# Patient Record
Sex: FEMALE | Race: WHITE | URBAN - METROPOLITAN AREA
[De-identification: names, ages, dates, MRNs, and addresses within clinical notes are randomized per-mention and may not be internally consistent; named-entity substitution may affect disease eponyms.]

---

## 2023-10-04 ENCOUNTER — OFFICE VISIT (OUTPATIENT)
Dept: FAMILY MEDICINE CLINIC | Facility: CLINIC | Age: 58
End: 2023-10-04
Payer: COMMERCIAL

## 2023-10-04 VITALS
WEIGHT: 216 LBS | SYSTOLIC BLOOD PRESSURE: 160 MMHG | BODY MASS INDEX: 36.43 KG/M2 | TEMPERATURE: 98 F | DIASTOLIC BLOOD PRESSURE: 100 MMHG | HEART RATE: 77 BPM | HEIGHT: 64.57 IN | RESPIRATION RATE: 18 BRPM | OXYGEN SATURATION: 98 %

## 2023-10-04 DIAGNOSIS — Z13.1 SCREENING FOR DIABETES MELLITUS: ICD-10-CM

## 2023-10-04 DIAGNOSIS — Z12.11 SCREENING FOR COLON CANCER: ICD-10-CM

## 2023-10-04 DIAGNOSIS — I10 ESSENTIAL HYPERTENSION, BENIGN: ICD-10-CM

## 2023-10-04 DIAGNOSIS — Z78.0 POSTMENOPAUSAL: ICD-10-CM

## 2023-10-04 DIAGNOSIS — Z12.31 ENCOUNTER FOR SCREENING MAMMOGRAM FOR MALIGNANT NEOPLASM OF BREAST: ICD-10-CM

## 2023-10-04 DIAGNOSIS — Z00.00 ROUTINE GENERAL MEDICAL EXAMINATION AT A HEALTH CARE FACILITY: Primary | ICD-10-CM

## 2023-10-04 DIAGNOSIS — Z12.4 SCREENING FOR CERVICAL CANCER: ICD-10-CM

## 2023-10-04 DIAGNOSIS — E66.09 CLASS 2 OBESITY DUE TO EXCESS CALORIES WITHOUT SERIOUS COMORBIDITY WITH BODY MASS INDEX (BMI) OF 36.0 TO 36.9 IN ADULT: ICD-10-CM

## 2023-10-04 PROCEDURE — 87624 HPV HI-RISK TYP POOLED RSLT: CPT | Performed by: FAMILY MEDICINE

## 2023-10-04 PROCEDURE — 3008F BODY MASS INDEX DOCD: CPT | Performed by: FAMILY MEDICINE

## 2023-10-04 PROCEDURE — 99204 OFFICE O/P NEW MOD 45 MIN: CPT | Performed by: FAMILY MEDICINE

## 2023-10-04 PROCEDURE — 3077F SYST BP >= 140 MM HG: CPT | Performed by: FAMILY MEDICINE

## 2023-10-04 PROCEDURE — 88175 CYTOPATH C/V AUTO FLUID REDO: CPT | Performed by: FAMILY MEDICINE

## 2023-10-04 PROCEDURE — 99386 PREV VISIT NEW AGE 40-64: CPT | Performed by: FAMILY MEDICINE

## 2023-10-04 PROCEDURE — 3080F DIAST BP >= 90 MM HG: CPT | Performed by: FAMILY MEDICINE

## 2023-10-04 RX ORDER — LISINOPRIL 10 MG/1
10 TABLET ORAL DAILY
Qty: 30 TABLET | Refills: 0 | Status: SHIPPED | OUTPATIENT
Start: 2023-10-04 | End: 2023-10-04

## 2023-10-04 RX ORDER — LISINOPRIL 10 MG/1
10 TABLET ORAL DAILY
Qty: 30 TABLET | Refills: 0 | Status: SHIPPED | OUTPATIENT
Start: 2023-10-04

## 2023-10-05 LAB — HPV I/H RISK 1 DNA SPEC QL NAA+PROBE: NEGATIVE

## 2023-10-06 ENCOUNTER — LAB ENCOUNTER (OUTPATIENT)
Dept: LAB | Age: 58
End: 2023-10-06
Attending: FAMILY MEDICINE
Payer: COMMERCIAL

## 2023-10-06 DIAGNOSIS — Z13.1 SCREENING FOR DIABETES MELLITUS: ICD-10-CM

## 2023-10-06 DIAGNOSIS — Z00.00 ROUTINE GENERAL MEDICAL EXAMINATION AT A HEALTH CARE FACILITY: ICD-10-CM

## 2023-10-06 LAB
ALBUMIN SERPL-MCNC: 3.6 G/DL (ref 3.4–5)
ALBUMIN/GLOB SERPL: 0.9 {RATIO} (ref 1–2)
ALP LIVER SERPL-CCNC: 64 U/L
ALT SERPL-CCNC: 33 U/L
ANION GAP SERPL CALC-SCNC: 7 MMOL/L (ref 0–18)
AST SERPL-CCNC: 23 U/L (ref 15–37)
BASOPHILS # BLD AUTO: 0.06 X10(3) UL (ref 0–0.2)
BASOPHILS NFR BLD AUTO: 0.9 %
BILIRUB SERPL-MCNC: 0.5 MG/DL (ref 0.1–2)
BUN BLD-MCNC: 18 MG/DL (ref 7–18)
CALCIUM BLD-MCNC: 9.3 MG/DL (ref 8.5–10.1)
CHLORIDE SERPL-SCNC: 108 MMOL/L (ref 98–112)
CHOLEST SERPL-MCNC: 211 MG/DL (ref ?–200)
CO2 SERPL-SCNC: 25 MMOL/L (ref 21–32)
CREAT BLD-MCNC: 0.97 MG/DL
EGFRCR SERPLBLD CKD-EPI 2021: 67 ML/MIN/1.73M2 (ref 60–?)
EOSINOPHIL # BLD AUTO: 0.18 X10(3) UL (ref 0–0.7)
EOSINOPHIL NFR BLD AUTO: 2.6 %
ERYTHROCYTE [DISTWIDTH] IN BLOOD BY AUTOMATED COUNT: 12.3 %
FASTING PATIENT LIPID ANSWER: YES
FASTING STATUS PATIENT QL REPORTED: YES
GLOBULIN PLAS-MCNC: 4.2 G/DL (ref 2.8–4.4)
GLUCOSE BLD-MCNC: 89 MG/DL (ref 70–99)
HCT VFR BLD AUTO: 40.9 %
HDLC SERPL-MCNC: 51 MG/DL (ref 40–59)
HGB BLD-MCNC: 13.9 G/DL
IMM GRANULOCYTES # BLD AUTO: 0.01 X10(3) UL (ref 0–1)
IMM GRANULOCYTES NFR BLD: 0.1 %
LDLC SERPL CALC-MCNC: 144 MG/DL (ref ?–100)
LYMPHOCYTES # BLD AUTO: 2.49 X10(3) UL (ref 1–4)
LYMPHOCYTES NFR BLD AUTO: 35.5 %
MCH RBC QN AUTO: 30.3 PG (ref 26–34)
MCHC RBC AUTO-ENTMCNC: 34 G/DL (ref 31–37)
MCV RBC AUTO: 89.1 FL
MONOCYTES # BLD AUTO: 0.69 X10(3) UL (ref 0.1–1)
MONOCYTES NFR BLD AUTO: 9.8 %
NEUTROPHILS # BLD AUTO: 3.58 X10 (3) UL (ref 1.5–7.7)
NEUTROPHILS # BLD AUTO: 3.58 X10(3) UL (ref 1.5–7.7)
NEUTROPHILS NFR BLD AUTO: 51.1 %
NONHDLC SERPL-MCNC: 160 MG/DL (ref ?–130)
OSMOLALITY SERPL CALC.SUM OF ELEC: 291 MOSM/KG (ref 275–295)
PLATELET # BLD AUTO: 246 10(3)UL (ref 150–450)
POTASSIUM SERPL-SCNC: 4.3 MMOL/L (ref 3.5–5.1)
PROT SERPL-MCNC: 7.8 G/DL (ref 6.4–8.2)
RBC # BLD AUTO: 4.59 X10(6)UL
SODIUM SERPL-SCNC: 140 MMOL/L (ref 136–145)
TRIGL SERPL-MCNC: 88 MG/DL (ref 30–149)
TSI SER-ACNC: 1.16 MIU/ML (ref 0.36–3.74)
VLDLC SERPL CALC-MCNC: 16 MG/DL (ref 0–30)
WBC # BLD AUTO: 7 X10(3) UL (ref 4–11)

## 2023-10-06 PROCEDURE — 80053 COMPREHEN METABOLIC PANEL: CPT

## 2023-10-06 PROCEDURE — 84443 ASSAY THYROID STIM HORMONE: CPT

## 2023-10-06 PROCEDURE — 85025 COMPLETE CBC W/AUTO DIFF WBC: CPT

## 2023-10-06 PROCEDURE — 80061 LIPID PANEL: CPT

## 2023-10-06 PROCEDURE — 36415 COLL VENOUS BLD VENIPUNCTURE: CPT

## 2023-10-06 PROCEDURE — 83036 HEMOGLOBIN GLYCOSYLATED A1C: CPT

## 2023-10-07 LAB
EST. AVERAGE GLUCOSE BLD GHB EST-MCNC: 111 MG/DL (ref 68–126)
HBA1C MFR BLD: 5.5 % (ref ?–5.7)

## 2023-10-16 ENCOUNTER — HOSPITAL ENCOUNTER (OUTPATIENT)
Dept: MAMMOGRAPHY | Age: 58
Discharge: HOME | End: 2023-10-16
Attending: FAMILY MEDICINE
Payer: COMMERCIAL

## 2023-10-16 DIAGNOSIS — Z12.31 ENCOUNTER FOR SCREENING MAMMOGRAM FOR MALIGNANT NEOPLASM OF BREAST: ICD-10-CM

## 2023-10-16 LAB — AMB EXT COLOGUARD RESULT: POSITIVE

## 2023-10-16 PROCEDURE — 77067 SCR MAMMO BI INCL CAD: CPT | Performed by: FAMILY MEDICINE

## 2023-10-16 PROCEDURE — 77063 BREAST TOMOSYNTHESIS BI: CPT | Performed by: FAMILY MEDICINE

## 2023-10-24 ENCOUNTER — TELEPHONE (OUTPATIENT)
Dept: FAMILY MEDICINE CLINIC | Facility: CLINIC | Age: 58
End: 2023-10-24

## 2023-10-25 ENCOUNTER — OFFICE VISIT (OUTPATIENT)
Dept: FAMILY MEDICINE CLINIC | Facility: CLINIC | Age: 58
End: 2023-10-25

## 2023-10-25 ENCOUNTER — MED REC SCAN ONLY (OUTPATIENT)
Dept: FAMILY MEDICINE CLINIC | Facility: CLINIC | Age: 58
End: 2023-10-25

## 2023-10-25 VITALS
SYSTOLIC BLOOD PRESSURE: 150 MMHG | RESPIRATION RATE: 18 BRPM | WEIGHT: 215 LBS | TEMPERATURE: 98 F | HEIGHT: 64.57 IN | HEART RATE: 94 BPM | DIASTOLIC BLOOD PRESSURE: 90 MMHG | OXYGEN SATURATION: 98 % | BODY MASS INDEX: 36.26 KG/M2

## 2023-10-25 DIAGNOSIS — I10 ESSENTIAL HYPERTENSION, BENIGN: Primary | ICD-10-CM

## 2023-10-25 DIAGNOSIS — R19.5 POSITIVE COLORECTAL CANCER SCREENING USING COLOGUARD TEST: ICD-10-CM

## 2023-10-25 RX ORDER — LISINOPRIL 20 MG/1
20 TABLET ORAL DAILY
Qty: 45 TABLET | Refills: 0 | Status: SHIPPED | OUTPATIENT
Start: 2023-10-25

## 2023-11-06 ENCOUNTER — HOSPITAL ENCOUNTER (OUTPATIENT)
Dept: MAMMOGRAPHY | Facility: HOSPITAL | Age: 58
Discharge: HOME | End: 2023-11-06
Attending: FAMILY MEDICINE
Payer: COMMERCIAL

## 2023-11-06 DIAGNOSIS — R92.8 ABNORMAL MAMMOGRAM: ICD-10-CM

## 2023-11-06 PROCEDURE — 76642 ULTRASOUND BREAST LIMITED: CPT | Performed by: FAMILY MEDICINE

## 2023-11-06 PROCEDURE — 77061 BREAST TOMOSYNTHESIS UNI: CPT | Performed by: FAMILY MEDICINE

## 2023-11-06 PROCEDURE — 77065 DX MAMMO INCL CAD UNI: CPT | Performed by: FAMILY MEDICINE

## 2023-11-28 ENCOUNTER — OFFICE VISIT (OUTPATIENT)
Dept: FAMILY MEDICINE CLINIC | Facility: CLINIC | Age: 58
End: 2023-11-28
Payer: COMMERCIAL

## 2023-11-28 DIAGNOSIS — I10 ESSENTIAL HYPERTENSION, BENIGN: ICD-10-CM

## 2023-11-28 PROCEDURE — 3008F BODY MASS INDEX DOCD: CPT | Performed by: FAMILY MEDICINE

## 2023-11-28 PROCEDURE — 99213 OFFICE O/P EST LOW 20 MIN: CPT | Performed by: FAMILY MEDICINE

## 2023-11-28 PROCEDURE — 3075F SYST BP GE 130 - 139MM HG: CPT | Performed by: FAMILY MEDICINE

## 2023-11-28 PROCEDURE — 3079F DIAST BP 80-89 MM HG: CPT | Performed by: FAMILY MEDICINE

## 2023-11-28 RX ORDER — LISINOPRIL 20 MG/1
20 TABLET ORAL DAILY
Qty: 90 TABLET | Refills: 0 | Status: SHIPPED | OUTPATIENT
Start: 2023-11-28

## 2023-11-29 VITALS
BODY MASS INDEX: 36.26 KG/M2 | SYSTOLIC BLOOD PRESSURE: 132 MMHG | TEMPERATURE: 98 F | HEIGHT: 64.57 IN | RESPIRATION RATE: 18 BRPM | OXYGEN SATURATION: 98 % | WEIGHT: 215 LBS | HEART RATE: 82 BPM | DIASTOLIC BLOOD PRESSURE: 86 MMHG

## 2024-02-28 ENCOUNTER — OFFICE VISIT (OUTPATIENT)
Dept: FAMILY MEDICINE CLINIC | Facility: CLINIC | Age: 59
End: 2024-02-28
Payer: COMMERCIAL

## 2024-02-28 ENCOUNTER — HOSPITAL ENCOUNTER (OUTPATIENT)
Dept: GENERAL RADIOLOGY | Age: 59
Discharge: HOME | End: 2024-02-28
Attending: FAMILY MEDICINE
Payer: COMMERCIAL

## 2024-02-28 VITALS
HEART RATE: 100 BPM | BODY MASS INDEX: 36.09 KG/M2 | WEIGHT: 214 LBS | HEIGHT: 64.57 IN | SYSTOLIC BLOOD PRESSURE: 140 MMHG | OXYGEN SATURATION: 98 % | TEMPERATURE: 98 F | RESPIRATION RATE: 18 BRPM | DIASTOLIC BLOOD PRESSURE: 78 MMHG

## 2024-02-28 DIAGNOSIS — M25.511 ACUTE PAIN OF RIGHT SHOULDER: ICD-10-CM

## 2024-02-28 DIAGNOSIS — I10 ESSENTIAL HYPERTENSION, BENIGN: Primary | ICD-10-CM

## 2024-02-28 PROCEDURE — 73030 X-RAY EXAM OF SHOULDER: CPT | Performed by: FAMILY MEDICINE

## 2024-02-28 PROCEDURE — 99214 OFFICE O/P EST MOD 30 MIN: CPT | Performed by: FAMILY MEDICINE

## 2024-02-28 RX ORDER — LISINOPRIL AND HYDROCHLOROTHIAZIDE 25; 20 MG/1; MG/1
1 TABLET ORAL DAILY
Qty: 90 TABLET | Refills: 0 | Status: SHIPPED | OUTPATIENT
Start: 2024-02-28

## 2024-02-28 RX ORDER — LISINOPRIL 20 MG/1
20 TABLET ORAL DAILY
Qty: 90 TABLET | Refills: 0 | Status: CANCELLED | OUTPATIENT
Start: 2024-02-28

## 2024-02-28 NOTE — PROGRESS NOTES
Monica Hughes is a 58 year old female.  Chief Complaint   Patient presents with    Hypertension     HPI:   Monica Hughes is a 58 year old female with hypertension seen for follow up and medication refill, states has not been monitoring her BP but previously when she did it systolic was in the range of high 130's to 140's/90.Denies headache, dizziness, blurred vision, chest pain or swelling in the legs. Tolerating medication well without any side effects.    Colonoscopy scheduled. Has an appointment in march for  at [facility].      ALLERGY:     Allergies   Allergen Reactions    Penicillins OTHER (SEE COMMENTS)       MEDICATIONS:     Current Outpatient Medications   Medication Sig Dispense Refill    lisinopril 20 MG Oral Tab Take 1 tablet (20 mg total) by mouth daily. 90 tablet 0      Past Medical History:   Diagnosis Date    Allergic rhinitis 20+ years ago    Daily Zyrtec works    Esophageal reflux occasionally    Essential hypertension 7/2023    [facility] high    Obesity       Social History:  Social History     Socioeconomic History    Marital status:    Tobacco Use    Smoking status: Never    Smokeless tobacco: Never   Vaping Use    Vaping Use: Never used   Substance and Sexual Activity    Alcohol use: Yes     Alcohol/week: 5.0 standard drinks of alcohol     Types: 2 Glasses of wine, 2 Shots of liquor, 1 Standard drinks or equivalent per week     Comment: not much of a drinker    Drug use: Never    Sexual activity: Yes   Other Topics Concern    Caffeine Concern No    Exercise No    Seat Belt No    Special Diet No    Stress Concern No    Weight Concern Yes     Comment: harder losing weight after 48.  I feel good and eat healthy        REVIEW OF SYSTEMS:   A comprehensive 10 point review of systems was completed.  Pertinent positives and negatives noted in the the HPI.    EXAM:   /86   Pulse 82   Temp 98 °F (36.7 °C) (Temporal)   Resp 18   Ht 5' 4.57\" (1.64 m)   Wt 215  lb (97.5 kg)   SpO2 98%   BMI 36.26 kg/m²   GENERAL: obese,in no apparent distress  NECK: supple,no adenopathy,no bruits  LUNGS: clear to auscultation  CARDIO: RRR without murmur  GI: good BS's,no masses, HSM or tenderness  EXTREMITIES: no cyanosis, clubbing or edema    ASSESSMENT AND PLAN:   Monica was seen today for hypertension.    Diagnoses and all orders for this visit:    Essential hypertension, benign uncontrolled  -     lisinopril-hydroCHLOROthiazide 20-25 MG Oral Tab; Take 1 tablet by mouth daily.  -     hydrochlorothiazide added  -     side effects to medication discussed  -     continue the same dose of medication  -     limit salt to less than 1000 mg daily  -     regular exercise.  -     goal BP less than 130/80    Acute pain of right shoulder  -     XR SHOULDER, COMPLETE (MIN 2 VIEWS), RIGHT (CPT=73030); Future  -     Physical Therapy Referral - [facility]    Return in about 3 months (around 5/28/2024) for HTN f/u.       The 21st Century Cures Act makes medical notes like these available to patients in the interest of transparency. Please be advised this is a medical document. Medical documents are intended to carry relevant information, facts as evident, and the clinical opinion of the practitioner. The medical note is intended as peer to peer communication and may appear blunt or direct. It is written in medical language and may contain abbreviations or verbiage that are unfamiliar.

## 2024-03-06 ENCOUNTER — PATIENT MESSAGE (OUTPATIENT)
Dept: FAMILY MEDICINE CLINIC | Facility: CLINIC | Age: 59
End: 2024-03-06

## 2024-03-07 NOTE — TELEPHONE ENCOUNTER
From: Monica Hughes  To: Bhumika Terrell  Sent: 3/6/2024 12:02 PM CST  Subject: Blood Pressure meds    Am I supposed to be taking both Lisinopril and the Lisinopril- hctz, or just the second one?

## 2024-05-22 ENCOUNTER — OFFICE VISIT (OUTPATIENT)
Dept: FAMILY MEDICINE CLINIC | Facility: CLINIC | Age: 59
End: 2024-05-22

## 2024-05-22 ENCOUNTER — PATIENT MESSAGE (OUTPATIENT)
Dept: FAMILY MEDICINE CLINIC | Facility: CLINIC | Age: 59
End: 2024-05-22

## 2024-05-22 VITALS
TEMPERATURE: 98 F | HEIGHT: 64.57 IN | BODY MASS INDEX: 35.59 KG/M2 | OXYGEN SATURATION: 98 % | WEIGHT: 211 LBS | RESPIRATION RATE: 18 BRPM | HEART RATE: 96 BPM | DIASTOLIC BLOOD PRESSURE: 78 MMHG | SYSTOLIC BLOOD PRESSURE: 128 MMHG

## 2024-05-22 DIAGNOSIS — Z00.00 LABORATORY EXAMINATION ORDERED AS PART OF A ROUTINE GENERAL MEDICAL EXAMINATION: ICD-10-CM

## 2024-05-22 DIAGNOSIS — E66.01 CLASS 2 SEVERE OBESITY DUE TO EXCESS CALORIES WITH SERIOUS COMORBIDITY AND BODY MASS INDEX (BMI) OF 35.0 TO 35.9 IN ADULT (HCC): ICD-10-CM

## 2024-05-22 DIAGNOSIS — I10 ESSENTIAL HYPERTENSION, BENIGN: Primary | ICD-10-CM

## 2024-05-22 DIAGNOSIS — I10 ESSENTIAL HYPERTENSION, BENIGN: ICD-10-CM

## 2024-05-22 PROBLEM — E66.812 CLASS 2 SEVERE OBESITY DUE TO EXCESS CALORIES WITH SERIOUS COMORBIDITY AND BODY MASS INDEX (BMI) OF 35.0 TO 35.9 IN ADULT (HCC): Status: ACTIVE | Noted: 2024-05-22

## 2024-05-22 PROBLEM — K62.1 RECTAL POLYP: Status: ACTIVE | Noted: 2024-03-25

## 2024-05-22 PROBLEM — K64.0 FIRST DEGREE HEMORRHOIDS: Status: ACTIVE | Noted: 2024-03-25

## 2024-05-22 PROBLEM — K63.5 POLYP OF COLON: Status: ACTIVE | Noted: 2024-03-25

## 2024-05-22 PROBLEM — K57.30 DIVERTICULOSIS OF LARGE INTESTINE WITHOUT PERFORATION OR ABSCESS WITHOUT BLEEDING: Status: ACTIVE | Noted: 2024-03-25

## 2024-05-22 PROCEDURE — 99213 OFFICE O/P EST LOW 20 MIN: CPT | Performed by: FAMILY MEDICINE

## 2024-05-22 RX ORDER — LISINOPRIL AND HYDROCHLOROTHIAZIDE 25; 20 MG/1; MG/1
1 TABLET ORAL DAILY
Qty: 90 TABLET | Refills: 1 | Status: SHIPPED | OUTPATIENT
Start: 2024-05-22 | End: 2024-05-22

## 2024-05-22 RX ORDER — LISINOPRIL AND HYDROCHLOROTHIAZIDE 25; 20 MG/1; MG/1
1 TABLET ORAL DAILY
Qty: 90 TABLET | Refills: 1 | Status: SHIPPED | OUTPATIENT
Start: 2024-05-22 | End: 2024-05-23

## 2024-05-22 NOTE — PROGRESS NOTES
Monica Hughes is a 59 year old female.  Chief Complaint   Patient presents with    Medication Follow-Up     HPI:   Monica Hughes is a 59 year old female with hypertension seen for follow up and medication refill, has been compliant with her blood pressure medication and low-salt diet, blood pressure at home has been well-controlled tolerating medication well without any side effects.    Patient had colonoscopy done in March, had a Colon polyp and rectal polyp and is due to repeat in 3 years.    ALLERGY:     Allergies   Allergen Reactions    Penicillins OTHER (SEE COMMENTS)     MEDICATIONS:     Current Outpatient Medications   Medication Sig Dispense Refill    lisinopril-hydroCHLOROthiazide 20-25 MG Oral Tab Take 1 tablet by mouth daily. 90 tablet 0      Past Medical History:    Allergic rhinitis    Daily Zyrtec works    Esophageal reflux    Essential hypertension    [facility]    Obesity      Social History:  Social History     Socioeconomic History    Marital status:    Tobacco Use    Smoking status: Never    Smokeless tobacco: Never   Vaping Use    Vaping status: Never Used   Substance and Sexual Activity    Alcohol use: Yes     Alcohol/week: 5.0 standard drinks of alcohol     Types: 2 Glasses of wine, 2 Shots of liquor, 1 Standard drinks or equivalent per week     Comment: not much of a drinker    Drug use: Never    Sexual activity: Yes   Other Topics Concern    Caffeine Concern No    Exercise No    Seat Belt No    Special Diet No    Stress Concern No    Weight Concern Yes     Comment: harder losing weight after 48.  I feel good and eat healthy        REVIEW OF SYSTEMS:   A comprehensive 10 point review of systems was completed.  Pertinent positives and negatives noted in the the HPI.    EXAM:   /86   Pulse 82   Temp 98 °F (36.7 °C) (Temporal)   Resp 18   Ht 5' 4.57\" (1.64 m)   Wt 215 lb (97.5 kg)   SpO2 98%   BMI 36.26 kg/m²   GENERAL: obese,in no apparent  distress  NECK: supple,no adenopathy,no bruits  LUNGS: clear to auscultation  CARDIO: RRR without murmur  GI: good BS's,no masses, HSM or tenderness  EXTREMITIES: no cyanosis, clubbing or edema    ASSESSMENT AND PLAN:   Monica was seen today for medication follow-up.    Diagnoses and all orders for this visit:    Essential hypertension, benign controlled  -     lisinopril-hydroCHLOROthiazide 20-25 MG Oral Tab; Take 1 tablet by mouth daily.  -     -     continue the same dose of medication  -     limit salt to less than 1000 mg daily  -     regular exercise.  -     goal BP less than 130/80    Laboratory examination ordered as part of a routine general medical examination  -     CBC With Differential With Platelet; Future  -     Assay, Thyroid Stim Hormone; Future  -     Lipid Panel; Future  -     Comp Metabolic Panel (14); Future    Class 2 severe obesity due to excess calories with serious comorbidity and body mass index (BMI) of 35.0 to 35.9 in adult (HCC)       - encouraged healthy diet and exercise.    Return in about 5 months (around 10/15/2024) for annual, HTN f/u.    The 21st Century Cures Act makes medical notes like these available to patients in the interest of transparency. Please be advised this is a medical document. Medical documents are intended to carry relevant information, facts as evident, and the clinical opinion of the practitioner. The medical note is intended as peer to peer communication and may appear blunt or direct. It is written in medical language and may contain abbreviations or verbiage that are unfamiliar.

## 2024-05-23 RX ORDER — LISINOPRIL AND HYDROCHLOROTHIAZIDE 25; 20 MG/1; MG/1
1 TABLET ORAL DAILY
Qty: 90 TABLET | Refills: 1 | Status: SHIPPED | OUTPATIENT
Start: 2024-05-23

## 2024-05-23 NOTE — TELEPHONE ENCOUNTER
Accidentally ordered rx to [facility].   Noted prescription already sent to [facility]  lisinopril-hydroCHLOROthiazide 20-25 MG Oral Tab (Discontinued) 90 tablet 1 5/22/2024 5/23/2024    Sig - Route: Take 1 tablet by mouth daily. - Oral    Sent to pharmacy as: Lisinopril-hydroCHLOROthiazide 20-25 MG Oral Tablet (Zestoretic)    E-Prescribing Status: Receipt confirmed by pharmacy (5/22/2024 10:30 AM CDT)      Called [facility] to cancel prescriptions.

## 2024-05-23 NOTE — TELEPHONE ENCOUNTER
From: Monica Hughes  To: Bhumika Terrell  Sent: 5/22/2024 7:04 PM CDT  Subject: Recent prescription     I need that script sent to [facility], not [facility]. The  said they were going to get it switched.   Thank you!

## 2024-09-16 ENCOUNTER — PATIENT MESSAGE (OUTPATIENT)
Dept: FAMILY MEDICINE CLINIC | Facility: CLINIC | Age: 59
End: 2024-09-16

## 2024-09-30 NOTE — TELEPHONE ENCOUNTER
From: Monica Hughes  Sent: 9/30/2024 10:24 AM CDT  To: [facility] Clinical Staff  Subject: October visit    Can you get the date on my blood changed to 10/9. They won't let me get the blood test done until 10/5 and I'm out of town 10/3-10/7. If I get the test 10/8, she went have the results for my appointment. I'm trying to get it done 10/1 or 10/2.

## 2024-10-02 ENCOUNTER — LAB ENCOUNTER (OUTPATIENT)
Dept: LAB | Age: 59
End: 2024-10-02
Attending: FAMILY MEDICINE
Payer: COMMERCIAL

## 2024-10-02 DIAGNOSIS — Z00.00 LABORATORY EXAMINATION ORDERED AS PART OF A ROUTINE GENERAL MEDICAL EXAMINATION: ICD-10-CM

## 2024-10-02 LAB
ALBUMIN SERPL-MCNC: 4.8 G/DL (ref 3.2–4.8)
ALBUMIN/GLOB SERPL: 1.5 {RATIO} (ref 1–2)
ALP LIVER SERPL-CCNC: 66 U/L
ALT SERPL-CCNC: 23 U/L
ANION GAP SERPL CALC-SCNC: 7 MMOL/L (ref 0–18)
AST SERPL-CCNC: 20 U/L (ref ?–34)
BASOPHILS # BLD AUTO: 0.06 X10(3) UL (ref 0–0.2)
BASOPHILS NFR BLD AUTO: 0.8 %
BILIRUB SERPL-MCNC: 0.5 MG/DL (ref 0.2–1.1)
BUN BLD-MCNC: 19 MG/DL (ref 9–23)
CALCIUM BLD-MCNC: 9.8 MG/DL (ref 8.7–10.4)
CHLORIDE SERPL-SCNC: 104 MMOL/L (ref 98–112)
CHOLEST SERPL-MCNC: 197 MG/DL (ref ?–200)
CO2 SERPL-SCNC: 28 MMOL/L (ref 21–32)
CREAT BLD-MCNC: 1.03 MG/DL
EGFRCR SERPLBLD CKD-EPI 2021: 62 ML/MIN/1.73M2 (ref 60–?)
EOSINOPHIL # BLD AUTO: 0.21 X10(3) UL (ref 0–0.7)
EOSINOPHIL NFR BLD AUTO: 2.8 %
ERYTHROCYTE [DISTWIDTH] IN BLOOD BY AUTOMATED COUNT: 12.3 %
FASTING PATIENT LIPID ANSWER: YES
FASTING STATUS PATIENT QL REPORTED: YES
GLOBULIN PLAS-MCNC: 3.3 G/DL (ref 2–3.5)
GLUCOSE BLD-MCNC: 102 MG/DL (ref 70–99)
HCT VFR BLD AUTO: 39.9 %
HDLC SERPL-MCNC: 50 MG/DL (ref 40–59)
HGB BLD-MCNC: 14 G/DL
IMM GRANULOCYTES # BLD AUTO: 0.01 X10(3) UL (ref 0–1)
IMM GRANULOCYTES NFR BLD: 0.1 %
LDLC SERPL CALC-MCNC: 129 MG/DL (ref ?–100)
LYMPHOCYTES # BLD AUTO: 2.73 X10(3) UL (ref 1–4)
LYMPHOCYTES NFR BLD AUTO: 36.9 %
MCH RBC QN AUTO: 31.3 PG (ref 26–34)
MCHC RBC AUTO-ENTMCNC: 35.1 G/DL (ref 31–37)
MCV RBC AUTO: 89.1 FL
MONOCYTES # BLD AUTO: 0.67 X10(3) UL (ref 0.1–1)
MONOCYTES NFR BLD AUTO: 9.1 %
NEUTROPHILS # BLD AUTO: 3.72 X10 (3) UL (ref 1.5–7.7)
NEUTROPHILS # BLD AUTO: 3.72 X10(3) UL (ref 1.5–7.7)
NEUTROPHILS NFR BLD AUTO: 50.3 %
NONHDLC SERPL-MCNC: 147 MG/DL (ref ?–130)
OSMOLALITY SERPL CALC.SUM OF ELEC: 290 MOSM/KG (ref 275–295)
PLATELET # BLD AUTO: 274 10(3)UL (ref 150–450)
POTASSIUM SERPL-SCNC: 3.8 MMOL/L (ref 3.5–5.1)
PROT SERPL-MCNC: 8.1 G/DL (ref 5.7–8.2)
RBC # BLD AUTO: 4.48 X10(6)UL
SODIUM SERPL-SCNC: 139 MMOL/L (ref 136–145)
TRIGL SERPL-MCNC: 100 MG/DL (ref 30–149)
TSI SER-ACNC: 1.56 MIU/ML (ref 0.55–4.78)
VLDLC SERPL CALC-MCNC: 18 MG/DL (ref 0–30)
WBC # BLD AUTO: 7.4 X10(3) UL (ref 4–11)

## 2024-10-02 PROCEDURE — 80053 COMPREHEN METABOLIC PANEL: CPT

## 2024-10-02 PROCEDURE — 85025 COMPLETE CBC W/AUTO DIFF WBC: CPT

## 2024-10-02 PROCEDURE — 80061 LIPID PANEL: CPT

## 2024-10-02 PROCEDURE — 84443 ASSAY THYROID STIM HORMONE: CPT

## 2024-10-02 PROCEDURE — 36415 COLL VENOUS BLD VENIPUNCTURE: CPT

## 2024-10-09 ENCOUNTER — OFFICE VISIT (OUTPATIENT)
Dept: FAMILY MEDICINE CLINIC | Facility: CLINIC | Age: 59
End: 2024-10-09
Payer: COMMERCIAL

## 2024-10-09 VITALS
RESPIRATION RATE: 18 BRPM | HEIGHT: 64.57 IN | HEART RATE: 78 BPM | BODY MASS INDEX: 34.24 KG/M2 | WEIGHT: 203 LBS | TEMPERATURE: 98 F | DIASTOLIC BLOOD PRESSURE: 80 MMHG | SYSTOLIC BLOOD PRESSURE: 130 MMHG | OXYGEN SATURATION: 98 %

## 2024-10-09 DIAGNOSIS — Z00.00 ROUTINE GENERAL MEDICAL EXAMINATION AT A HEALTH CARE FACILITY: Primary | ICD-10-CM

## 2024-10-09 DIAGNOSIS — Z78.0 POSTMENOPAUSAL: ICD-10-CM

## 2024-10-09 DIAGNOSIS — I10 ESSENTIAL HYPERTENSION, BENIGN: ICD-10-CM

## 2024-10-09 DIAGNOSIS — E66.09 CLASS 1 OBESITY DUE TO EXCESS CALORIES WITH SERIOUS COMORBIDITY AND BODY MASS INDEX (BMI) OF 34.0 TO 34.9 IN ADULT: ICD-10-CM

## 2024-10-09 DIAGNOSIS — E66.811 CLASS 1 OBESITY DUE TO EXCESS CALORIES WITH SERIOUS COMORBIDITY AND BODY MASS INDEX (BMI) OF 34.0 TO 34.9 IN ADULT: ICD-10-CM

## 2024-10-09 DIAGNOSIS — R73.01 IMPAIRED FASTING GLUCOSE: ICD-10-CM

## 2024-10-09 DIAGNOSIS — E78.00 PURE HYPERCHOLESTEROLEMIA: ICD-10-CM

## 2024-10-09 DIAGNOSIS — Z12.31 VISIT FOR SCREENING MAMMOGRAM: ICD-10-CM

## 2024-10-09 PROCEDURE — 99213 OFFICE O/P EST LOW 20 MIN: CPT | Performed by: FAMILY MEDICINE

## 2024-10-09 PROCEDURE — 99396 PREV VISIT EST AGE 40-64: CPT | Performed by: FAMILY MEDICINE

## 2024-10-09 RX ORDER — LISINOPRIL AND HYDROCHLOROTHIAZIDE 20; 25 MG/1; MG/1
1 TABLET ORAL DAILY
Qty: 90 TABLET | Refills: 1 | Status: SHIPPED | OUTPATIENT
Start: 2024-10-09

## 2024-10-09 NOTE — PROGRESS NOTES
Family Medicine Progress Note    Monica Hughes is a 59 year old female.  ASSESSMENT AND PLAN:  Monica was seen today for physical.    Diagnoses and all orders for this visit:    Routine general medical examination at a health care facility    Visit for screening mammogram  -     3D Mammogram Digital Screen, Bilateral (CPT=77067/15304); Future    Postmenopausal  -     XR DEXA BONE DENSITOMETRY (CPT=77080); Future    Essential hypertension, benign controlled  -     lisinopril-hydroCHLOROthiazide 20-25 MG Oral Tab; Take 1 tablet by mouth daily.  -     continue the same dose of medication  -     DASH diet, limit salt to less than 2000 mg  -     Gaol BP less than 130/80    Class 1 obesity due to excess calories with serious comorbidity and body mass index (BMI) of 34.0 to 34.9 in adult          - encouraged to continue healthy portion controlled diet and regular exercise.    Impaired fasting glucose         - encouraged to limit refined sugars and carbs in diet    Pure hypercholesterolemia        - encourage to limit saturated fats and cholesterol in diet.    Age appropriate anticipatory guidance reviewed  Health Maintenance   Topic Date Due    Zoster Vaccines (1 of 2) Never done    COVID-19 Vaccine (3 - 2023-24 season) 09/01/2024    Annual Physical  10/04/2024    Mammogram  11/06/2024    Influenza Vaccine (1) 06/30/2025 (Originally 10/1/2024)    Pap Smear  10/04/2026    Colorectal Cancer Screening  10/16/2026    DTaP,Tdap,and Td Vaccines (3 - Td or Tdap) 06/03/2031    Annual Depression Screening  Completed    Pneumococcal Vaccine: Birth to 64yrs  Aged Out       The patient indicates understanding of these issues and agrees to the plan.  Follow-Up: The patient is asked to return in Return in about 6 months (around 4/9/2025) for HTN f/u.  .     Bhumika Terrell MD   10/09/24      HPI:   Monica Hughes is a 59 year old female with history of hypertension seen for her  annual physical.  Pap don in  was normal, post menopausal denies any spotting or bleeding.  Does do breast self exam, due for her mammogram, no family history of breast cancer.  Patient has not scheduled colonoscopy yet, denies constipation or blood in stool, no family history of colon ca.    States tries to eat healthy, not exercising but is active and has been doing more yard work.    Compliant with blood pressure medication and low salt diet, states BP at home has been in the 120's/70's -80. Tolerating medication well without any side effects. Denies headache, dizziness, vision changes, chest pain, palpitations or swelling in the legs.    PAST MEDICAL HISTORY:     Past Medical History:    Allergic rhinitis    Daily Zyrtec works    Esophageal reflux    Essential hypertension    [facility]    Obesity     PAST SURGICAL HISTORY:     Past Surgical History:   Procedure Laterality Date          twin stillbirth- , 3 normal births, , ,      ALLERGY:   Allergies[1]  MEDICATIONS:     Current Outpatient Medications   Medication Sig Dispense Refill    lisinopril-hydroCHLOROthiazide 20-25 MG Oral Tab Take 1 tablet by mouth daily. 90 tablet 1     FAMILY HISTORY:     Family History   Problem Relation Age of Onset    Heart Disorder Mother         Heart Disease    Hypertension Mother     Obesity Mother        SOCIAL HISTORY:     Social History     Socioeconomic History    Marital status:    Tobacco Use    Smoking status: Never    Smokeless tobacco: Never   Vaping Use    Vaping status: Never Used   Substance and Sexual Activity    Alcohol use: Yes     Alcohol/week: 5.0 standard drinks of alcohol     Types: 2 Glasses of wine, 2 Shots of liquor, 1 Standard drinks or equivalent per week     Comment: not much of a drinker    Drug use: Never    Sexual activity: Yes   Other Topics Concern    Caffeine Concern No    Exercise No    Seat Belt No    Special Diet No    Stress Concern No     Weight Concern Yes     Comment: harder losing weight after 48.  I feel good and eat healthy     REVIEW OF SYSTEMS:   Review of Systems   Constitutional:  Negative for appetite change, fatigue, fever and unexpected weight change.   HENT:  Negative for congestion, ear pain, hearing loss and sore throat.    Eyes:  Negative for discharge, redness and visual disturbance.   Respiratory:  Negative for cough, chest tightness and shortness of breath.    Cardiovascular:  Negative for chest pain and palpitations.   Gastrointestinal:  Negative for abdominal pain, blood in stool, constipation and nausea.   Endocrine: Negative for cold intolerance, heat intolerance and polyuria.   Genitourinary:  Negative for difficulty urinating, dysuria, frequency and urgency.   Musculoskeletal:  Negative for arthralgias, gait problem, joint swelling and myalgias.   Skin:  Negative for rash.   Allergic/Immunologic: Negative for food allergies.   Neurological:  Negative for dizziness, weakness, numbness and headaches.   Hematological:  Negative for adenopathy. Does not bruise/bleed easily.   Psychiatric/Behavioral:  Negative for dysphoric mood and sleep disturbance. The patient is not nervous/anxious.         PHYSICAL EXAM:   /80   Pulse 78   Temp 98.3 °F (36.8 °C) (Temporal)   Resp 18   Ht 5' 4.57\" (1.64 m)   Wt 203 lb (92.1 kg)   SpO2 98%   BMI 34.23 kg/m²     Physical Exam  Constitutional:       General: She is not in acute distress.     Appearance: Normal appearance. She is well-developed. She is obese.   HENT:      Head: Normocephalic and atraumatic.      Right Ear: Tympanic membrane, ear canal and external ear normal.      Left Ear: Tympanic membrane, ear canal and external ear normal.      Nose: Nose normal.      Mouth/Throat:      Mouth: Mucous membranes are moist.      Pharynx: Oropharynx is clear.   Eyes:      Extraocular Movements: Extraocular movements intact.      Conjunctiva/sclera: Conjunctivae normal.      Pupils:  Pupils are equal, round, and reactive to light.   Neck:      Thyroid: No thyromegaly.      Vascular: No carotid bruit.   Cardiovascular:      Rate and Rhythm: Normal rate and regular rhythm.      Pulses: Normal pulses.      Heart sounds: Normal heart sounds. No murmur heard.  Pulmonary:      Effort: Pulmonary effort is normal. No respiratory distress.      Breath sounds: Normal breath sounds.   Chest:      Chest wall: No tenderness.   Breasts:     Right: No swelling, inverted nipple, mass, nipple discharge, skin change or tenderness.      Left: No swelling, inverted nipple, mass, nipple discharge, skin change or tenderness.   Abdominal:      General: Bowel sounds are normal. There is no distension.      Palpations: Abdomen is soft. There is no hepatomegaly, splenomegaly or mass.      Tenderness: There is no abdominal tenderness.      Hernia: No hernia is present.   Musculoskeletal:         General: Normal range of motion.      Cervical back: Normal range of motion and neck supple.   Lymphadenopathy:      Cervical: No cervical adenopathy.      Upper Body:      Right upper body: No supraclavicular, axillary or pectoral adenopathy.      Left upper body: No supraclavicular, axillary or pectoral adenopathy.   Skin:     General: Skin is warm.      Findings: No rash.   Neurological:      Mental Status: She is alert and oriented to person, place, and time.      Deep Tendon Reflexes: Reflexes are normal and symmetric.   Psychiatric:         Behavior: Behavior normal.         Thought Content: Thought content normal.         Judgment: Judgment normal.        LABS AND IMAGING:     Component      Latest Ref [facility] 10/2/2024   WBC      4.0 - 11.0 x10(3) uL 7.4    RBC      3.80 - 5.30 x10(6)uL 4.48    Hemoglobin      12.0 - 16.0 g/dL 14.0    Hematocrit      35.0 - 48.0 % 39.9    Platelet Count      150.0 - 450.0 10(3)uL 274.0    MCV      80.0 - 100.0 fL 89.1    MCH      26.0 - 34.0 pg 31.3    MCHC      31.0 - 37.0 g/dL 35.1    RDW       % 12.3    Prelim Neutrophil Abs      1.50 - 7.70 x10 (3) uL 3.72    Neutrophils Absolute      1.50 - 7.70 x10(3) uL 3.72    Lymphocytes Absolute      1.00 - 4.00 x10(3) uL 2.73    Monocytes Absolute      0.10 - 1.00 x10(3) uL 0.67    Eosinophils Absolute      0.00 - 0.70 x10(3) uL 0.21    Basophils Absolute      0.00 - 0.20 x10(3) uL 0.06    Immature Granulocyte Absolute      0.00 - 1.00 x10(3) uL 0.01    Neutrophils %      % 50.3    Lymphocytes %      % 36.9    Monocytes %      % 9.1    Eosinophils %      % 2.8    Basophils %      % 0.8    Immature Granulocyte %      % 0.1    Glucose      70 - 99 mg/dL 102 (H)    Sodium      136 - 145 mmol/L 139    Potassium      3.5 - 5.1 mmol/L 3.8    Chloride      98 - 112 mmol/L 104    Carbon Dioxide, Total      21.0 - 32.0 mmol/L 28.0    ANION GAP      0 - 18 mmol/L 7    BUN      9 - 23 mg/dL 19    CREATININE      0.55 - 1.02 mg/dL 1.03 (H)    CALCIUM      8.7 - 10.4 mg/dL 9.8    CALCULATED OSMOLALITY      275 - 295 mOsm/kg 290    EGFR      >=60 mL/min/1.73m2 62    AST (SGOT)      <34 U/L 20    ALT (SGPT)      10 - 49 U/L 23    ALKALINE PHOSPHATASE      50 - 130 U/L 66    Total Bilirubin      0.2 - 1.1 mg/dL 0.5    PROTEIN, TOTAL      5.7 - 8.2 g/dL 8.1    Albumin      3.2 - 4.8 g/dL 4.8    Globulin      2.0 - 3.5 g/dL 3.3    A/G Ratio      1.0 - 2.0  1.5    Patient Fasting for CMP? Yes    Cholesterol, Total      <200 mg/dL 197    HDL Cholesterol      40 - 59 mg/dL 50    Triglycerides      30 - 149 mg/dL 100    LDL Cholesterol Calc      <100 mg/dL 129 (H)    VLDL      0 - 30 mg/dL 18    NON-HDL CHOLESTEROL      <130 mg/dL 147 (H)    Patient Fasting for Lipid? Yes    TSH      0.550 - 4.780 mIU/mL 1.563       Legend:  (H) High       The 21st Century Cures Act makes medical notes like these available to patients in the interest of transparency. Please be advised this is a medical document. Medical documents are intended to carry relevant information, facts as evident, and the  clinical opinion of the practitioner. The medical note is intended as peer to peer communication and may appear blunt or direct. It is written in medical language and may contain abbreviations or verbiage that are unfamiliar.     Bhumika Sainz MD           [1]   Allergies  Allergen Reactions    Penicillins OTHER (SEE COMMENTS)

## 2024-10-10 PROBLEM — E78.00 PURE HYPERCHOLESTEROLEMIA: Status: ACTIVE | Noted: 2024-10-10

## 2025-04-23 ENCOUNTER — OFFICE VISIT (OUTPATIENT)
Dept: FAMILY MEDICINE CLINIC | Facility: CLINIC | Age: 60
End: 2025-04-23
Payer: COMMERCIAL

## 2025-04-23 VITALS
OXYGEN SATURATION: 98 % | HEIGHT: 64.57 IN | TEMPERATURE: 98 F | BODY MASS INDEX: 34.57 KG/M2 | WEIGHT: 205 LBS | DIASTOLIC BLOOD PRESSURE: 78 MMHG | SYSTOLIC BLOOD PRESSURE: 138 MMHG | RESPIRATION RATE: 18 BRPM | HEART RATE: 80 BPM

## 2025-04-23 DIAGNOSIS — Z13.1 SCREENING FOR DIABETES MELLITUS: ICD-10-CM

## 2025-04-23 DIAGNOSIS — I10 ESSENTIAL HYPERTENSION, BENIGN: Primary | ICD-10-CM

## 2025-04-23 DIAGNOSIS — E66.811 CLASS 1 OBESITY DUE TO EXCESS CALORIES WITH SERIOUS COMORBIDITY AND BODY MASS INDEX (BMI) OF 34.0 TO 34.9 IN ADULT: ICD-10-CM

## 2025-04-23 DIAGNOSIS — E66.09 CLASS 1 OBESITY DUE TO EXCESS CALORIES WITH SERIOUS COMORBIDITY AND BODY MASS INDEX (BMI) OF 34.0 TO 34.9 IN ADULT: ICD-10-CM

## 2025-04-23 DIAGNOSIS — J20.8 VIRAL BRONCHITIS: ICD-10-CM

## 2025-04-23 DIAGNOSIS — Z00.00 LABORATORY EXAMINATION ORDERED AS PART OF A ROUTINE GENERAL MEDICAL EXAMINATION: ICD-10-CM

## 2025-04-23 PROCEDURE — 99213 OFFICE O/P EST LOW 20 MIN: CPT | Performed by: FAMILY MEDICINE

## 2025-04-23 RX ORDER — LISINOPRIL AND HYDROCHLOROTHIAZIDE 20; 25 MG/1; MG/1
1 TABLET ORAL DAILY
Qty: 90 TABLET | Refills: 1 | Status: SHIPPED | OUTPATIENT
Start: 2025-04-23

## 2025-04-23 NOTE — PATIENT INSTRUCTIONS
VISIT SUMMARY:    You came in today for a routine follow-up to manage your hypertension. We also discussed your recent upper respiratory infection and the importance of rescheduling your bone density and mammogram screenings.    YOUR PLAN:    -VIRAL BRONCHITIS: You have a recent upper respiratory infection with symptoms of cough and wheezing, likely due to viral bronchitis. This condition can take three to four weeks for symptoms to fully resolve. Your symptoms are improving, but you may still experience occasional coughing. No specific treatment is needed other than rest and staying hydrated.    -ESSENTIAL HYPERTENSION, BENIGN CONTROLLED: Your blood pressure readings at home are consistently in the 130s/80s, which indicates good control of your hypertension. This condition means that your blood pressure is higher than normal but is being managed well with medication. Continue taking your Lisinopril-hydrochlorothiazide 20-25 MG daily, monitor your blood pressure at home, and increase your physical activity, especially with the upcoming summer.    -WELLNESS VISIT: This was a routine wellness visit. We discussed the importance of scheduling your bone density and mammogram screenings, which were previously ordered but not yet completed. Please schedule these screenings before October.    INSTRUCTIONS:    Please schedule a follow-up appointment in six months for a physical exam and blood work. Additionally, make sure to schedule your bone density screening and mammogram before October.

## 2025-04-23 NOTE — PROGRESS NOTES
Family Medicine Progress Note  ASSESSMENT AND PLAN:  Monica Hughes is a 60 year old female who is here for:     Monica was seen today for other, ankle pain and medication follow-up.    Diagnoses and all orders for this visit:    Essential hypertension, benign  Blood pressure readings at home are consistently in the 130s/80s, indicating good control. No symptoms of headache, dizziness, chest pain, or leg swelling. Family history of heart disease, with mother's history of hypertension and heart failure. Blood pressure readings closer to 130/80 or under are acceptable. Encouraged increased physical activity with the upcoming summer to aid in maintaining blood pressure control.  - Continue Lisinopril-hydrochlorothiazide 20-25 MG oral daily.  - Monitor blood pressure at home.  - Schedule follow-up in six months for physical exam and blood work.  - Encourage physical activity, especially with the upcoming summer.  -     lisinopril-hydroCHLOROthiazide 20-25 MG Oral Tab; Take 1 tablet by mouth daily.    Class 1 obesity due to excess calories with serious comorbidity and body mass index (BMI) of 34.0 to 34.9 in adult    Laboratory examination ordered as part of a routine general medical examination  -     CBC With Differential With Platelet; Future  -     Assay, Thyroid Stim Hormone; Future  -     Lipid Panel; Future  -     Comp Metabolic Panel (14); Future    Screening for diabetes mellitus  -     Hemoglobin A1C; Future    Viral bronchitis  Recent upper respiratory infection with symptoms of cough and wheezing, likely viral bronchitis. Symptoms include a tickle in the throat and occasional cough spasms. Lungs are clear on examination. Symptoms are improving, but cough persists occasionally. Explained that viral bronchitis can take three to four weeks for symptoms to resolve completely.      The patient indicates understanding of these issues and agrees to the plan.  Follow-Up: The patient is asked to  Return in  about 6 months (around 10/23/2025) for HTN f/u, annual.      Bhumika Terrell MD        CC:  Ankle Pain, and Medication Follow-Up    The following individual(s) verbally consented to be recorded using ambient AI listening technology and understand that they can each withdraw their consent to this listening technology at any point by asking the clinician to turn off or pause the recording:    Patient name: Monica Hughes    HPI:     History of Present Illness  She is a 60 year old female who presents for a routine follow-up for hypertension management.    She monitors her blood pressure at home, with readings typically in the 130s over a bottom number under 80. She continues to take her prescribed medication for hypertension, which she refills every six months.    She recently experienced an upper respiratory infection about a week ago, characterized by a 'wheezy' feeling and cough spasms. Both she and her  had deep, initially non-productive coughs that have started to produce sputum. She also experienced lightheadedness during coughing fits. The symptoms are improving, and she has not coughed much today.      ALLERGY:     Allergies as of 04/23/2025 - Review Complete 04/23/2025   Allergen Reaction Noted    Penicillins OTHER (SEE COMMENTS) 02/13/2020     MEDICATIONS:     Current Medications[1]   Past Medical History[2]   Social History:  Short Social Hx on File[3]     REVIEW OF SYSTEMS:   GENERAL HEALTH: feels well otherwise  SKIN: denies any unusual skin lesions or rashes  RESPIRATORY: denies shortness of breath with exertion  CARDIOVASCULAR: denies chest pain on exertion  GI: denies abdominal pain and denies heartburn  NEURO: denies headaches    EXAM:   /78   Pulse 80   Temp 97.9 °F (36.6 °C) (Temporal)   Resp 18   Ht 5' 4.57\" (1.64 m)   Wt 205 lb (93 kg)   SpO2 98%   BMI 34.57 kg/m²   GENERAL: obese,in no apparent distress  SKIN: no rashes,no suspicious lesions  HEENT: atraumatic,  normocephalic,ears and throat are clear  NECK: supple,no adenopathy,no bruits  LUNGS: clear to auscultation, no wheezing, rhonchi or rales  CARDIO: RRR without murmur  GI: good BS's,no masses, HSM or tenderness  EXTREMITIES: no cyanosis, clubbing or edema      NOTE TO PATIENT: The 21st Century Cures Act makes clinical notes like these available to patients in the interest of transparency. Clinical notes are medical documents used by physicians and care providers to communicate with each other. These documents include medical language and terminology, abbreviations, and treatment information that may sound technical and at times possibly unfamiliar. In addition, at times, the verbiage may appear blunt or direct. These documents are one tool providers use to communicate relevant information and clinical opinions of the care providers in a way that allows common understanding of the clinical context.      Bhumika Terrell MD             [1]   Current Outpatient Medications   Medication Sig Dispense Refill    lisinopril-hydroCHLOROthiazide 20-25 MG Oral Tab Take 1 tablet by mouth daily. 90 tablet 1   [2]   Past Medical History:   Allergic rhinitis    Daily Zyrtec works    Esophageal reflux    Essential hypertension    [facility]    Obesity   [3]   Social History  Socioeconomic History    Marital status:    Tobacco Use    Smoking status: Never    Smokeless tobacco: Never   Vaping Use    Vaping status: Never Used   Substance and Sexual Activity    Alcohol use: Yes     Alcohol/week: 3.0 standard drinks of alcohol     Types: 1 Glasses of wine, 1 Shots of liquor, 1 Standard drinks or equivalent per week     Comment: not much of a drinker    Drug use: Never    Sexual activity: Yes   Other Topics Concern    Caffeine Concern No    Exercise No    Seat Belt No    Special Diet No    Stress Concern No    Weight Concern Yes     Comment: harder losing weight after 48.  I feel good and eat healthy     Social  Drivers of Health     Food Insecurity: No Food Insecurity (4/23/2025)    NCSS - Food Insecurity     Worried About Running Out of Food in the Last Year: No     Ran Out of Food in the Last Year: No   Transportation Needs: No Transportation Needs (4/23/2025)    NCSS - Transportation     Lack of Transportation: No   Housing Stability: Not At Risk (4/23/2025)    NCSS - Housing/Utilities     Has Housing: Yes     Worried About Losing Housing: No     Unable to Get Utilities: No

## 2025-06-24 ENCOUNTER — TELEMEDICINE (OUTPATIENT)
Dept: TELEHEALTH | Age: 60
End: 2025-06-24
Payer: COMMERCIAL

## 2025-06-24 DIAGNOSIS — R39.9 UTI SYMPTOMS: Primary | ICD-10-CM

## 2025-06-24 PROCEDURE — 99213 OFFICE O/P EST LOW 20 MIN: CPT | Performed by: PHYSICIAN ASSISTANT

## 2025-06-24 NOTE — PROGRESS NOTES
Virtual/Telephone Check-In    Monica Hughes verbally consents to a Virtual/Telephone Check-In service on 06/24/25.  Patient has been referred to the [facility] website at [email or web address] to review the yearly Consent to Treat document.  Patient understands and accepts financial responsibility for any deductible, co-insurance and/or co-pays associated with this service.       Telehealth Verbal Consent   I conducted a telehealth visit with Monica Hughes today, 06/24/25, which was completed using two-way, real-time interactive audio and video communication. This has been done in good jamie to provide continuity of care in the best interest of the provider-patient relationship, due to the COVID -19 public health crisis/national emergency where restrictions of face-to-face office visits are ongoing. Every conscious effort was taken to allow for sufficient and adequate time to complete the visit.  The patient was made aware of the limitations of the telehealth visit, including treatment limitations as no physical exam could be performed.  The patient was advised to call 911 or to go to the ER in case there was an emergency.  The patient was also advised of the potential privacy & security concerns related to the telehealth platform.   The patient was made aware of where to find [facility]'s notice of privacy practices, telehealth consent form and other related consent forms and documents.  which are located on the [facility] website. The patient verbally agreed to telehealth consent form, related consents and the risks discussed.    Lastly, the patient confirmed that they were in [place].   Included in this visit, time may have been spent reviewing labs, medications, radiology tests and decision making. Appropriate medical decision-making and tests are ordered as detailed in the plan of care above.  Coding/billing information is submitted for this visit based on complexity of care and/or time spent for the visit.    CHIEF  COMPLAINT:       Chief Complaint   Patient presents with    Urinary Symptoms             HPI:       Monica Hughes is a 60 year old female who presents for a video visit.  Patient reports concerns for possible UTI.  Reports last 2-3 days she is having some pressure/pain at end of urination.  No blood in urine.  Slight frequency.  No change in color or odor.  Denies lower abd pressure. No chills, fever, back ache.  Good bowel movements.  No concern for STI.  No vaginal discharge/itching.  No longer having menstrual cycles    Current Medications[1]   Past Medical History[2]   Past Surgical History[3]      Short Social Hx on File[4]      REVIEW OF SYSTEMS:   GENERAL: no fever/chills  SKIN: no rashes or abnormal skin lesions  : see hPI  GI: denies N/V/C or abdominal pain    EXAM:   General: Alert, Well-appearing, and In no acute distress  Respiratory:   Speaking in full sentences comfortably  Normal work of breathing  Head: Normocephalic  Nose: No obvious nasal discharge.  Skin: No obvious rashes or lesions from what observed.     No results found for this or any previous visit (from the past 24 hours).    ASSESSMENT AND PLAN:   Monica Hughes is a 60 year old female who presents with symptoms that are consistent with    ASSESSMENT:   Encounter Diagnosis   Name Primary?    UTI symptoms Yes       PLAN:  Patient to go to [facility] this evening for UA and urine culture collection.  If UA concerning for infection, will treat with Macrobid.  If UA WNL will have patient start Azo and comfort care measures while awaiting urine culture.       The patient indicates understanding of these issues and agrees to the plan.  The patient is asked to return if sx's persist or worsen.    Face to face time spent on Video Visit: 8  Total Time spent on visit including reviewing history, ordering labs/medication, patient examination and education: 15    Monica Hughes understands video visit evaluation is not a substitute  for face-to-face examination or emergency care. Patient advised to go to ER or call 911 for worsening symptoms or acute distress.           [1]   Current Outpatient Medications   Medication Sig Dispense Refill    lisinopril-hydroCHLOROthiazide 20-25 MG Oral Tab Take 1 tablet by mouth daily. 90 tablet 1   [2]   Past Medical History:   Allergic rhinitis    Daily Zyrtec works    Esophageal reflux    Essential hypertension    [facility]    Obesity   [3]   Past Surgical History:  Procedure Laterality Date          twin stillbirth- , 3 normal births, , ,    [4]   Social History  Socioeconomic History    Marital status:    Tobacco Use    Smoking status: Never    Smokeless tobacco: Never   Vaping Use    Vaping status: Never Used   Substance and Sexual Activity    Alcohol use: Yes     Alcohol/week: 3.0 standard drinks of alcohol     Types: 1 Glasses of wine, 1 Shots of liquor, 1 Standard drinks or equivalent per week     Comment: not much of a drinker    Drug use: Never    Sexual activity: Yes   Other Topics Concern    Caffeine Concern No    Exercise No    Seat Belt No    Special Diet No    Stress Concern No    Weight Concern Yes     Comment: harder losing weight after 48.  I feel good and eat healthy     Social Drivers of Health     Food Insecurity: No Food Insecurity (2025)    NCSS - Food Insecurity     Worried About Running Out of Food in the Last Year: No     Ran Out of Food in the Last Year: No   Transportation Needs: No Transportation Needs (2025)    NCSS - Transportation     Lack of Transportation: No   Housing Stability: Not At Risk (2025)    NCSS - Housing/Utilities     Has Housing: Yes     Worried About Losing Housing: No     Unable to Get Utilities: No

## 2025-06-25 ENCOUNTER — LAB ENCOUNTER (OUTPATIENT)
Dept: LAB | Age: 60
End: 2025-06-25
Attending: PHYSICIAN ASSISTANT
Payer: COMMERCIAL

## 2025-06-25 DIAGNOSIS — Z00.00 LABORATORY EXAMINATION ORDERED AS PART OF A ROUTINE GENERAL MEDICAL EXAMINATION: ICD-10-CM

## 2025-06-25 DIAGNOSIS — Z13.1 SCREENING FOR DIABETES MELLITUS: ICD-10-CM

## 2025-06-25 PROCEDURE — 87086 URINE CULTURE/COLONY COUNT: CPT | Performed by: PHYSICIAN ASSISTANT

## 2025-06-25 PROCEDURE — 87186 SC STD MICRODIL/AGAR DIL: CPT | Performed by: PHYSICIAN ASSISTANT

## 2025-06-25 PROCEDURE — 87077 CULTURE AEROBIC IDENTIFY: CPT | Performed by: PHYSICIAN ASSISTANT
